# Patient Record
Sex: FEMALE | URBAN - METROPOLITAN AREA
[De-identification: names, ages, dates, MRNs, and addresses within clinical notes are randomized per-mention and may not be internally consistent; named-entity substitution may affect disease eponyms.]

---

## 2022-10-29 ENCOUNTER — HOSPITAL ENCOUNTER (EMERGENCY)
Facility: MEDICAL CENTER | Age: 37
End: 2022-10-29
Attending: EMERGENCY MEDICINE

## 2022-10-29 ENCOUNTER — APPOINTMENT (OUTPATIENT)
Dept: RADIOLOGY | Facility: MEDICAL CENTER | Age: 37
End: 2022-10-29
Attending: EMERGENCY MEDICINE

## 2022-10-29 ENCOUNTER — APPOINTMENT (OUTPATIENT)
Dept: RADIOLOGY | Facility: MEDICAL CENTER | Age: 37
End: 2022-10-29

## 2022-10-29 VITALS
WEIGHT: 198 LBS | SYSTOLIC BLOOD PRESSURE: 115 MMHG | OXYGEN SATURATION: 98 % | HEIGHT: 64 IN | HEART RATE: 95 BPM | RESPIRATION RATE: 18 BRPM | DIASTOLIC BLOOD PRESSURE: 84 MMHG | BODY MASS INDEX: 33.8 KG/M2 | TEMPERATURE: 97.5 F
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
## 2022-10-29 DIAGNOSIS — S20.212A CONTUSION OF LEFT FRONT WALL OF THORAX, INITIAL ENCOUNTER: ICD-10-CM

## 2022-10-29 PROCEDURE — 73130 X-RAY EXAM OF HAND: CPT | Mod: RT

## 2022-10-29 PROCEDURE — 307740 HCHG GREEN TRAUMA TEAM SERVICES

## 2022-10-29 PROCEDURE — 71101 X-RAY EXAM UNILAT RIBS/CHEST: CPT | Mod: LT

## 2022-10-29 PROCEDURE — 99284 EMERGENCY DEPT VISIT MOD MDM: CPT
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
## 2022-10-29 NOTE — ED NOTES
Med rec completed per patient at bedside.  Allergies reviewed with patient. NKDA.  Patient's preferred [facility]: [facility].     Patient denies using any prescription medications at home.  No vitamins or supplements.  No recent over-the-counter medications.  No outpatient antibiotics in the last 30 days.

## 2022-10-29 NOTE — DISCHARGE INSTRUCTIONS
You will be sore today, probably worse tomorrow.  Activity as tolerated use Motrin Tylenol for discomfort.  Use ice packs to assist with specific areas that are hurting.
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
## 2022-10-29 NOTE — Clinical Note
Di Acevedo was seen and treated in our emergency department on 10/29/2022.  She may return to work on 10/31/2022.       If you have any questions or concerns, please don't hesitate to call.      Ron Paris D.O.
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
